# Patient Record
Sex: FEMALE | Race: WHITE | NOT HISPANIC OR LATINO
[De-identification: names, ages, dates, MRNs, and addresses within clinical notes are randomized per-mention and may not be internally consistent; named-entity substitution may affect disease eponyms.]

---

## 2021-04-02 ENCOUNTER — TRANSCRIPTION ENCOUNTER (OUTPATIENT)
Age: 51
End: 2021-04-02

## 2022-02-10 ENCOUNTER — EMERGENCY (EMERGENCY)
Facility: HOSPITAL | Age: 52
LOS: 0 days | Discharge: HOME | End: 2022-02-10
Attending: EMERGENCY MEDICINE
Payer: MEDICARE

## 2022-02-10 VITALS
SYSTOLIC BLOOD PRESSURE: 149 MMHG | HEIGHT: 66 IN | TEMPERATURE: 99 F | RESPIRATION RATE: 18 BRPM | HEART RATE: 106 BPM | WEIGHT: 199.96 LBS | DIASTOLIC BLOOD PRESSURE: 100 MMHG | OXYGEN SATURATION: 95 %

## 2022-02-10 DIAGNOSIS — R07.9 CHEST PAIN, UNSPECIFIED: ICD-10-CM

## 2022-02-10 DIAGNOSIS — I10 ESSENTIAL (PRIMARY) HYPERTENSION: ICD-10-CM

## 2022-02-10 DIAGNOSIS — R00.0 TACHYCARDIA, UNSPECIFIED: ICD-10-CM

## 2022-02-10 DIAGNOSIS — E78.5 HYPERLIPIDEMIA, UNSPECIFIED: ICD-10-CM

## 2022-02-10 LAB
ALBUMIN SERPL ELPH-MCNC: 3.9 G/DL — SIGNIFICANT CHANGE UP (ref 3.3–5)
ALP SERPL-CCNC: 91 U/L — SIGNIFICANT CHANGE UP (ref 40–120)
ALT FLD-CCNC: 57 U/L — SIGNIFICANT CHANGE UP (ref 12–78)
ANION GAP SERPL CALC-SCNC: 8 MMOL/L — SIGNIFICANT CHANGE UP (ref 5–17)
AST SERPL-CCNC: 42 U/L — HIGH (ref 15–37)
BASOPHILS # BLD AUTO: 0.03 K/UL — SIGNIFICANT CHANGE UP (ref 0–0.2)
BASOPHILS NFR BLD AUTO: 0.3 % — SIGNIFICANT CHANGE UP (ref 0–2)
BILIRUB SERPL-MCNC: 0.3 MG/DL — SIGNIFICANT CHANGE UP (ref 0.2–1.2)
BUN SERPL-MCNC: 14 MG/DL — SIGNIFICANT CHANGE UP (ref 7–23)
CALCIUM SERPL-MCNC: 9.3 MG/DL — SIGNIFICANT CHANGE UP (ref 8.5–10.1)
CHLORIDE SERPL-SCNC: 106 MMOL/L — SIGNIFICANT CHANGE UP (ref 96–108)
CO2 SERPL-SCNC: 23 MMOL/L — SIGNIFICANT CHANGE UP (ref 22–31)
CREAT SERPL-MCNC: 0.72 MG/DL — SIGNIFICANT CHANGE UP (ref 0.5–1.3)
D DIMER BLD IA.RAPID-MCNC: 243 NG/ML DDU — HIGH
EOSINOPHIL # BLD AUTO: 0.16 K/UL — SIGNIFICANT CHANGE UP (ref 0–0.5)
EOSINOPHIL NFR BLD AUTO: 1.5 % — SIGNIFICANT CHANGE UP (ref 0–6)
GLUCOSE SERPL-MCNC: 123 MG/DL — HIGH (ref 70–99)
HCG SERPL-ACNC: <1 MIU/ML — SIGNIFICANT CHANGE UP
HCT VFR BLD CALC: 39.9 % — SIGNIFICANT CHANGE UP (ref 34.5–45)
HGB BLD-MCNC: 13.1 G/DL — SIGNIFICANT CHANGE UP (ref 11.5–15.5)
IMM GRANULOCYTES NFR BLD AUTO: 0.3 % — SIGNIFICANT CHANGE UP (ref 0–1.5)
LACTATE SERPL-SCNC: 2 MMOL/L — SIGNIFICANT CHANGE UP (ref 0.7–2)
LYMPHOCYTES # BLD AUTO: 2.82 K/UL — SIGNIFICANT CHANGE UP (ref 1–3.3)
LYMPHOCYTES # BLD AUTO: 26.9 % — SIGNIFICANT CHANGE UP (ref 13–44)
MAGNESIUM SERPL-MCNC: 2 MG/DL — SIGNIFICANT CHANGE UP (ref 1.6–2.6)
MCHC RBC-ENTMCNC: 29.2 PG — SIGNIFICANT CHANGE UP (ref 27–34)
MCHC RBC-ENTMCNC: 32.8 GM/DL — SIGNIFICANT CHANGE UP (ref 32–36)
MCV RBC AUTO: 88.9 FL — SIGNIFICANT CHANGE UP (ref 80–100)
MONOCYTES # BLD AUTO: 0.64 K/UL — SIGNIFICANT CHANGE UP (ref 0–0.9)
MONOCYTES NFR BLD AUTO: 6.1 % — SIGNIFICANT CHANGE UP (ref 2–14)
NEUTROPHILS # BLD AUTO: 6.79 K/UL — SIGNIFICANT CHANGE UP (ref 1.8–7.4)
NEUTROPHILS NFR BLD AUTO: 64.9 % — SIGNIFICANT CHANGE UP (ref 43–77)
PHOSPHATE SERPL-MCNC: 3.4 MG/DL — SIGNIFICANT CHANGE UP (ref 2.5–4.5)
PLATELET # BLD AUTO: 324 K/UL — SIGNIFICANT CHANGE UP (ref 150–400)
POTASSIUM SERPL-MCNC: 4.2 MMOL/L — SIGNIFICANT CHANGE UP (ref 3.5–5.3)
POTASSIUM SERPL-SCNC: 4.2 MMOL/L — SIGNIFICANT CHANGE UP (ref 3.5–5.3)
PROT SERPL-MCNC: 8.7 GM/DL — HIGH (ref 6–8.3)
RBC # BLD: 4.49 M/UL — SIGNIFICANT CHANGE UP (ref 3.8–5.2)
RBC # FLD: 13.2 % — SIGNIFICANT CHANGE UP (ref 10.3–14.5)
SODIUM SERPL-SCNC: 137 MMOL/L — SIGNIFICANT CHANGE UP (ref 135–145)
TROPONIN I, HIGH SENSITIVITY RESULT: 3.58 NG/L — SIGNIFICANT CHANGE UP
TROPONIN I, HIGH SENSITIVITY RESULT: 6.47 NG/L — SIGNIFICANT CHANGE UP
WBC # BLD: 10.47 K/UL — SIGNIFICANT CHANGE UP (ref 3.8–10.5)
WBC # FLD AUTO: 10.47 K/UL — SIGNIFICANT CHANGE UP (ref 3.8–10.5)

## 2022-02-10 PROCEDURE — 71046 X-RAY EXAM CHEST 2 VIEWS: CPT | Mod: 26

## 2022-02-10 PROCEDURE — 85025 COMPLETE CBC W/AUTO DIFF WBC: CPT

## 2022-02-10 PROCEDURE — 99285 EMERGENCY DEPT VISIT HI MDM: CPT | Mod: 25

## 2022-02-10 PROCEDURE — 71046 X-RAY EXAM CHEST 2 VIEWS: CPT

## 2022-02-10 PROCEDURE — 85379 FIBRIN DEGRADATION QUANT: CPT

## 2022-02-10 PROCEDURE — 84100 ASSAY OF PHOSPHORUS: CPT

## 2022-02-10 PROCEDURE — 93005 ELECTROCARDIOGRAM TRACING: CPT

## 2022-02-10 PROCEDURE — 71275 CT ANGIOGRAPHY CHEST: CPT | Mod: 26,ME

## 2022-02-10 PROCEDURE — G1004: CPT

## 2022-02-10 PROCEDURE — 83605 ASSAY OF LACTIC ACID: CPT

## 2022-02-10 PROCEDURE — 99285 EMERGENCY DEPT VISIT HI MDM: CPT

## 2022-02-10 PROCEDURE — 83735 ASSAY OF MAGNESIUM: CPT

## 2022-02-10 PROCEDURE — 84702 CHORIONIC GONADOTROPIN TEST: CPT

## 2022-02-10 PROCEDURE — 80053 COMPREHEN METABOLIC PANEL: CPT

## 2022-02-10 PROCEDURE — 93010 ELECTROCARDIOGRAM REPORT: CPT

## 2022-02-10 PROCEDURE — 71275 CT ANGIOGRAPHY CHEST: CPT | Mod: ME

## 2022-02-10 PROCEDURE — 36415 COLL VENOUS BLD VENIPUNCTURE: CPT

## 2022-02-10 PROCEDURE — 84484 ASSAY OF TROPONIN QUANT: CPT

## 2022-02-10 NOTE — ED ADULT NURSE NOTE - OBJECTIVE STATEMENT
pt presents to ED s/p chest tightness x1 week that radiates to jaw. Received full dose ASA PTA at doctor's office- Dr. Oreilly. hx DM, HTN, HLD.

## 2022-02-10 NOTE — ED STATDOCS - OBJECTIVE STATEMENT
50 y/o female with a PMHx of gestational diabetes, HLD, HTN presents to the ED c/o CP x 1 week and pain radiating to left jaw on 02/05/2022; now resolved. Pt also notes her arm was twitching 2 days ago. Pt went to her PCP, Dr. Oreilly, for symptoms and was sent to ED for evaluation. No sick contacts or recent travel. Pt given 4 ASA by EMS prior to arrival. Pt is not vaccinated for COVID. 50 y/o female with a PMHx of gestational diabetes, HLD, HTN presents to the ED c/o CP x 1 week s/p getting a mammogram. Pt also reports she had pain radiating to left jaw on 02/05/2022 now resolved and arm twitching 2 days ago. Pt went to her PCP, Dr. Oreilly, for symptoms and was sent to ED for evaluation. Denies other symptoms. No sick contacts or recent travel. Pt given 4 ASA by EMS prior to arrival. Pt is not vaccinated for COVID.

## 2022-02-10 NOTE — ED STATDOCS - ATTENDING CONTRIBUTION TO CARE
I, Mallory Flores MD,  performed the initial face to face bedside interview with this patient regarding history of present illness, review of symptoms and relevant past medical, social and family history.  I completed an independent physical examination.  I was the initial provider who evaluated this patient.   I personally saw the patient and performed a substantive portion of the visit including all aspects of the medical decision making.  I have signed out the follow up of any pending tests (i.e. labs, radiological studies) to the ACP.  I have communicated the patient’s plan of care and disposition with the ACP.  The history, relevant review of systems, past medical and surgical history, medical decision making, and physical examination was documented by the scribe in my presence and I attest to the accuracy of the documentation.

## 2022-02-10 NOTE — ED STATDOCS - PATIENT PORTAL LINK FT
You can access the FollowMyHealth Patient Portal offered by [facility] by registering at the following website: [email or web address]. By joining [facility]’s FollowMyHealth portal, you will also be able to view your health information using other applications (apps) compatible with our system.

## 2022-02-10 NOTE — ED STATDOCS - CARE PROVIDER_API CALL
Von Gerardo (MD)  Cardiovascular Disease  [address], [address]  [place] [address]  Phone: [phone]  Fax: [phone]  Follow Up Time: Urgent

## 2022-02-10 NOTE — ED STATDOCS - PROGRESS NOTE DETAILS
pt aware of labs and agrees to CTA r/o PE and well appearing. -Betty Venegas PA-C pt aware of all labs and imaging results and will fu with cardiologist and her PMD. pt well appearing on dc and agrees with plan. -Betty Venegas PA-C

## 2022-02-28 ENCOUNTER — NON-APPOINTMENT (OUTPATIENT)
Age: 52
End: 2022-02-28

## 2022-02-28 ENCOUNTER — APPOINTMENT (OUTPATIENT)
Dept: CARDIOLOGY | Facility: CLINIC | Age: 52
End: 2022-02-28
Payer: MEDICARE

## 2022-02-28 VITALS
DIASTOLIC BLOOD PRESSURE: 76 MMHG | SYSTOLIC BLOOD PRESSURE: 124 MMHG | RESPIRATION RATE: 16 BRPM | HEIGHT: 66 IN | BODY MASS INDEX: 31.5 KG/M2 | TEMPERATURE: 98.2 F | OXYGEN SATURATION: 100 % | HEART RATE: 101 BPM | WEIGHT: 196 LBS

## 2022-02-28 DIAGNOSIS — R00.0 TACHYCARDIA, UNSPECIFIED: ICD-10-CM

## 2022-02-28 DIAGNOSIS — E66.9 OBESITY, UNSPECIFIED: ICD-10-CM

## 2022-02-28 DIAGNOSIS — Z78.9 OTHER SPECIFIED HEALTH STATUS: ICD-10-CM

## 2022-02-28 PROCEDURE — 99205 OFFICE O/P NEW HI 60 MIN: CPT

## 2022-02-28 PROCEDURE — 93000 ELECTROCARDIOGRAM COMPLETE: CPT

## 2022-02-28 NOTE — PHYSICAL EXAM
[Obese] : obese [Normal Conjunctiva] : normal conjunctiva [Normal Venous Pressure] : normal venous pressure [No Carotid Bruit] : no carotid bruit [Normal S1, S2] : normal S1, S2 [No Murmur] : no murmur [No Rub] : no rub [No Gallop] : no gallop [Clear Lung Fields] : clear lung fields [Good Air Entry] : good air entry [No Respiratory Distress] : no respiratory distress  [Soft] : abdomen soft [Non Tender] : non-tender [No Masses/organomegaly] : no masses/organomegaly [Normal Bowel Sounds] : normal bowel sounds [Normal Gait] : normal gait [No Edema] : no edema [No Cyanosis] : no cyanosis [No Clubbing] : no clubbing [No Varicosities] : no varicosities [No Rash] : no rash [No Skin Lesions] : no skin lesions [Moves all extremities] : moves all extremities [No Focal Deficits] : no focal deficits [Normal Speech] : normal speech [Alert and Oriented] : alert and oriented [Normal memory] : normal memory

## 2022-02-28 NOTE — HISTORY OF PRESENT ILLNESS
[FreeTextEntry1] : 51 year old female with hx HTN DM  HLD obesity came for cardiac evaluation with complain she was referred from [facility] for cardiac evaluation after evaluation for chest pain.\par \par as per patient who had mammogram two days prior to onset of chest , was feeling feeling discomfort in left breast  was arguing with some one , developed worsening of chest discomfort worsening , felt some left jaw discomfort , went to primary office next day , where she was noted to be tachycardic , patient denies any shortness of breath or dizziness. Patient says her chest discomfort was there for one week , patient says she felt better with stretching , patient  had blood work showed normal troponin , ekg was normal , \par \par Patient had normal CT Angiogram  showed no evidence of PE , patient says she was not taking medication for Hypertension that was prescribed 2 years ago, also was given started  statin which she never took it , \par \par  Patient is not taking medication     her blood pressure is normal without any medication, \par \par patients hospital visit records reviewed ,  ekg sinus tachycardia 104 BPM  had minimal elevated d dimer , for which she had CT angio  negative for PE , patient  had normal troponin .

## 2022-03-10 ENCOUNTER — APPOINTMENT (OUTPATIENT)
Dept: CARDIOLOGY | Facility: CLINIC | Age: 52
End: 2022-03-10
Payer: MEDICARE

## 2022-03-11 ENCOUNTER — APPOINTMENT (OUTPATIENT)
Dept: CARDIOLOGY | Facility: CLINIC | Age: 52
End: 2022-03-11
Payer: MEDICARE

## 2022-03-11 ENCOUNTER — NON-APPOINTMENT (OUTPATIENT)
Age: 52
End: 2022-03-11

## 2022-03-11 PROCEDURE — 93306 TTE W/DOPPLER COMPLETE: CPT

## 2022-03-11 PROCEDURE — 93015 CV STRESS TEST SUPVJ I&R: CPT

## 2022-03-17 ENCOUNTER — NON-APPOINTMENT (OUTPATIENT)
Age: 52
End: 2022-03-17

## 2022-03-21 ENCOUNTER — NON-APPOINTMENT (OUTPATIENT)
Age: 52
End: 2022-03-21

## 2022-03-21 PROCEDURE — 93224 XTRNL ECG REC UP TO 48 HRS: CPT

## 2022-03-22 LAB
ESTIMATED AVERAGE GLUCOSE: 157 MG/DL
HBA1C MFR BLD HPLC: 7.1 %
T3FREE SERPL-MCNC: 3.08 PG/ML
T4 FREE SERPL-MCNC: 1.1 NG/DL
TSH SERPL-ACNC: 1.26 UIU/ML

## 2022-03-28 ENCOUNTER — APPOINTMENT (OUTPATIENT)
Dept: CARDIOLOGY | Facility: CLINIC | Age: 52
End: 2022-03-28
Payer: MEDICARE

## 2022-03-28 VITALS
BODY MASS INDEX: 32.14 KG/M2 | SYSTOLIC BLOOD PRESSURE: 112 MMHG | HEART RATE: 86 BPM | DIASTOLIC BLOOD PRESSURE: 84 MMHG | WEIGHT: 200 LBS | HEIGHT: 66 IN | OXYGEN SATURATION: 100 %

## 2022-03-28 DIAGNOSIS — I10 ESSENTIAL (PRIMARY) HYPERTENSION: ICD-10-CM

## 2022-03-28 DIAGNOSIS — E78.5 HYPERLIPIDEMIA, UNSPECIFIED: ICD-10-CM

## 2022-03-28 DIAGNOSIS — E11.9 TYPE 2 DIABETES MELLITUS W/OUT COMPLICATIONS: ICD-10-CM

## 2022-03-28 DIAGNOSIS — R07.89 OTHER CHEST PAIN: ICD-10-CM

## 2022-03-28 PROCEDURE — 99214 OFFICE O/P EST MOD 30 MIN: CPT

## 2022-03-28 RX ORDER — METFORMIN ER 500 MG 500 MG/1
500 TABLET ORAL
Qty: 360 | Refills: 0 | Status: ACTIVE | COMMUNITY
Start: 1900-01-01 | End: 1900-01-01

## 2022-03-28 NOTE — ASSESSMENT
[FreeTextEntry1] : Patient with above hx \par \par atypical chest pain : possible breast pain  ( mammogram injury ? )  , possible due to GERD, doubt cardiac origin , negative troponin ,   no recurrence , normal exercise stress test , \par \par Abnormal ekg : sinus tachycardia , patient does have high normal range resting heart as per patient , patient  lowest 74 BPM during sleep , Average 100 BPM , highest 178  during exercise , \par \par HTN  controlled continue diet restriction . Patient was not taking prescribed medication  \par \par DM : encourage patient to follow diet restriction ,HB A1c 7.1  increase metformin to 1000 mg po BID  follow up with primary \par \par Hyperlipidemia Minimal elevated total cholesterol encourage patient to loose weight  , diet restriction , follow up repeat blood work \par \par \par follow up after 4 months

## 2022-03-28 NOTE — CARDIOLOGY SUMMARY
[de-identified] : 3/21/22  sinus rhythm , rare isolated PACS   2 ,    2 isolated PVC  [de-identified] : 2/28/22  sinus tachycardia 101  [de-identified] : 3/28/22  12.42  101% 10 METS  No ST changes  [de-identified] : 3/11/22  EF 63% Mild DD Trace MR

## 2022-03-28 NOTE — HISTORY OF PRESENT ILLNESS
[FreeTextEntry1] : 51 year old female with hx HTN DM  HLD obesity came for follow up after having recommended tests , Patient had normal  stress test ,  echo showed normal EF mild DD , monitor showed 2 isolated PAcS , PVC  \par \par no recurrence of chest pain  since last visit \par \par patient denies any shortness of breath or dizziness. Patient says her chest discomfort was there for one week , patient says she felt better with stretching , patient  had blood work showed normal troponin , ekg was normal , \par \par Patient had normal CT Angiogram  showed no evidence of PE , patient says she was not taking medication for Hypertension that was prescribed 2 years ago, also was given started  statin which she never took it , \par \par  Patient is not taking medication     her blood pressure is normal without any medication, \par \par patients hospital visit records reviewed ,  ekg sinus tachycardia 104 BPM  had minimal elevated d dimer , for which she had CT angio  negative for PE , patient  had normal troponin .\par \par Patient  blood work showed HB A1c 7.1  ,  normal  TSH   holter average rate 100

## 2022-03-30 ENCOUNTER — APPOINTMENT (OUTPATIENT)
Dept: CARDIOLOGY | Facility: CLINIC | Age: 52
End: 2022-03-30

## 2022-04-19 ENCOUNTER — APPOINTMENT (OUTPATIENT)
Dept: AFTER HOURS CARE | Facility: EMERGENCY ROOM | Age: 52
End: 2022-04-19

## 2022-04-20 PROCEDURE — 99204 OFFICE O/P NEW MOD 45 MIN: CPT | Mod: 1L,95

## 2022-04-25 PROBLEM — Z00.00 ENCOUNTER FOR PREVENTIVE HEALTH EXAMINATION: Noted: 2022-04-25

## 2022-05-09 ENCOUNTER — APPOINTMENT (OUTPATIENT)
Dept: ORTHOPEDIC SURGERY | Facility: CLINIC | Age: 52
End: 2022-05-09
Payer: MEDICARE

## 2022-05-09 VITALS — BODY MASS INDEX: 29.73 KG/M2 | WEIGHT: 185 LBS | HEIGHT: 66 IN

## 2022-05-09 DIAGNOSIS — M54.16 RADICULOPATHY, LUMBAR REGION: ICD-10-CM

## 2022-05-09 DIAGNOSIS — M51.26 OTHER INTERVERTEBRAL DISC DISPLACEMENT, LUMBAR REGION: ICD-10-CM

## 2022-05-09 PROCEDURE — 99213 OFFICE O/P EST LOW 20 MIN: CPT
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
## 2022-08-08 ENCOUNTER — APPOINTMENT (OUTPATIENT)
Dept: ORTHOPEDIC SURGERY | Facility: CLINIC | Age: 52
End: 2022-08-08

## 2022-11-05 NOTE — ASSESSMENT
[FreeTextEntry1] : urinary symptoms without fever, chills, nausea, vomiting, persistent abd pain, flank pain

## 2022-11-05 NOTE — HISTORY OF PRESENT ILLNESS
[Home] : at home, [unfilled] , at the time of the visit. [Other Location: e.g. Home (Enter Location, City,State)___] : at [unfilled] [Verbal consent obtained from patient] : the patient, [unfilled] [FreeTextEntry8] : 50 yo female with PMH DM for evaluation of urinary symptoms since yesterday. Urgerncy with small urine quantities, increased frequency, hematuria. Denies dysuria, abd pain, fevers chills, back pain, nausea, vomiting. \par allergies: none\par LMP: last month end of month - states she doesn't think she is pregnant

## 2022-11-09 ENCOUNTER — RESULT REVIEW (OUTPATIENT)
Age: 52
End: 2022-11-09

## 2023-04-03 PROBLEM — O24.419 GESTATIONAL DIABETES MELLITUS IN PREGNANCY, UNSPECIFIED CONTROL: Chronic | Status: ACTIVE | Noted: 2022-02-10

## 2023-04-03 PROBLEM — I10 ESSENTIAL (PRIMARY) HYPERTENSION: Chronic | Status: ACTIVE | Noted: 2022-02-10

## 2023-04-03 PROBLEM — E78.5 HYPERLIPIDEMIA, UNSPECIFIED: Chronic | Status: ACTIVE | Noted: 2022-02-10

## 2023-04-13 ENCOUNTER — APPOINTMENT (OUTPATIENT)
Dept: OTOLARYNGOLOGY | Facility: CLINIC | Age: 53
End: 2023-04-13
Payer: MEDICARE

## 2023-04-13 VITALS — SYSTOLIC BLOOD PRESSURE: 141 MMHG | DIASTOLIC BLOOD PRESSURE: 91 MMHG | HEART RATE: 75 BPM

## 2023-04-13 DIAGNOSIS — R73.03 PREDIABETES.: ICD-10-CM

## 2023-04-13 PROCEDURE — 31231 NASAL ENDOSCOPY DX: CPT

## 2023-04-13 PROCEDURE — 99204 OFFICE O/P NEW MOD 45 MIN: CPT | Mod: 25

## 2023-04-13 RX ORDER — NAPROXEN 500 MG/1
500 TABLET ORAL
Qty: 60 | Refills: 2 | Status: COMPLETED | COMMUNITY
Start: 2022-05-09 | End: 2023-04-13

## 2023-04-13 RX ORDER — FLUTICASONE PROPIONATE 50 MCG
SPRAY, SUSPENSION NASAL
Refills: 0 | Status: ACTIVE | COMMUNITY

## 2023-04-13 RX ORDER — LISINOPRIL 10 MG/1
10 TABLET ORAL
Refills: 0 | Status: COMPLETED | COMMUNITY
End: 2023-04-13

## 2023-04-13 NOTE — HISTORY OF PRESENT ILLNESS
[de-identified] : 52 year old female presents for evaluation of L-sided sinus symptoms\par Started 3wk ago with L-sided dental pain\par Very clogged left nostril, left rhinorrhea and sinus pressure went to urgent care and was prescribed Flonase and Amoxcillin (never took the antibiotic)\par Reports Thick mucus in throat that occasionally feels stuck. \par She developed a sore in mouth but has now subsided. \par Used saline rinses and had green/yellow foul smelling odor- while using rinses reports bilateral otalgia \par States voice has been raspy\par Sense of smell and taste is poor \par Recurrent sinus infections: No\par \par Type 2 DM (metformin)

## 2023-04-17 ENCOUNTER — APPOINTMENT (OUTPATIENT)
Dept: CT IMAGING | Facility: CLINIC | Age: 53
End: 2023-04-17

## 2023-04-21 LAB — EAR NOSE AND THROAT CULTURE: ABNORMAL

## 2023-04-21 RX ORDER — SULFAMETHOXAZOLE AND TRIMETHOPRIM 800; 160 MG/1; MG/1
800-160 TABLET ORAL TWICE DAILY
Qty: 20 | Refills: 0 | Status: ACTIVE | COMMUNITY
Start: 2023-04-21 | End: 1900-01-01

## 2023-05-23 ENCOUNTER — APPOINTMENT (OUTPATIENT)
Dept: OTOLARYNGOLOGY | Facility: CLINIC | Age: 53
End: 2023-05-23
Payer: MEDICARE

## 2023-05-23 VITALS
OXYGEN SATURATION: 99 % | BODY MASS INDEX: 30.53 KG/M2 | SYSTOLIC BLOOD PRESSURE: 120 MMHG | HEIGHT: 66 IN | WEIGHT: 190 LBS | HEART RATE: 90 BPM | DIASTOLIC BLOOD PRESSURE: 85 MMHG

## 2023-05-23 PROCEDURE — 99214 OFFICE O/P EST MOD 30 MIN: CPT | Mod: 25

## 2023-05-23 PROCEDURE — 31231 NASAL ENDOSCOPY DX: CPT

## 2023-05-23 RX ORDER — AMOXICILLIN AND CLAVULANATE POTASSIUM 875; 125 MG/1; MG/1
875-125 TABLET, COATED ORAL
Qty: 20 | Refills: 0 | Status: DISCONTINUED | COMMUNITY
Start: 2023-04-13 | End: 2023-05-23

## 2023-05-23 NOTE — HISTORY OF PRESENT ILLNESS
[de-identified] : 52 year old woman with hx of chronic sinusitis presents for follow-up\par LCV 4/13/23\par Took PO abx, INCS, steroid rinses-- stopped abx due to allergic reaction\par States symptoms not completely resolved - intermittent nasal congestion - whistling sound from nose heard when sleeping at night - increased Left facial pressure - persistent poor sense of smell - ears and throat are now affected by sinus symptoms - otalgia with clogged sensation and voice changes with throat clearing\par No recent sinus infections \par \par CT Maxifacial Sinus 4/14/23-- reviewed personally-- IMPRESSION: Finding consistent with obstruction of multiple drainage pathways in the Left hiatus semilunaris.  Soft tissue in the Left maxillary sinus is in continuity with the soft tissues in the Left middle meatus and could be inflammatory though an underlying neoplastic etiology must also be considered.  Finally, an odontogenic origin of an infectious sinusitis must also be considered.

## 2023-05-23 NOTE — PHYSICAL EXAM
[Nasal Endoscopy Performed] : nasal endoscopy was performed, see procedure section for findings [Midline] : trachea located in midline position [de-identified] : No OA fistula [Normal] : no rashes

## 2023-06-16 ENCOUNTER — APPOINTMENT (OUTPATIENT)
Dept: OTOLARYNGOLOGY | Facility: CLINIC | Age: 53
End: 2023-06-16
Payer: MEDICARE

## 2023-06-16 VITALS
SYSTOLIC BLOOD PRESSURE: 138 MMHG | HEIGHT: 66 IN | HEART RATE: 87 BPM | DIASTOLIC BLOOD PRESSURE: 90 MMHG | WEIGHT: 190 LBS | BODY MASS INDEX: 30.53 KG/M2

## 2023-06-16 DIAGNOSIS — K21.9 GASTRO-ESOPHAGEAL REFLUX DISEASE W/OUT ESOPHAGITIS: ICD-10-CM

## 2023-06-16 DIAGNOSIS — R49.0 DYSPHONIA: ICD-10-CM

## 2023-06-16 PROCEDURE — 92567 TYMPANOMETRY: CPT

## 2023-06-16 PROCEDURE — 99213 OFFICE O/P EST LOW 20 MIN: CPT | Mod: 25

## 2023-06-16 PROCEDURE — 92557 COMPREHENSIVE HEARING TEST: CPT

## 2023-06-16 PROCEDURE — 31231 NASAL ENDOSCOPY DX: CPT

## 2023-06-16 NOTE — HISTORY OF PRESENT ILLNESS
[de-identified] : Yaneli Nick is a 51 yo female with hx T2DM who presents for evaluation of chronic sinusitis. She had left nasal congestion and sinus pressure since Mar 2023. She saw Dr. Rodrigues who cultured purulent drainage showing Klebsiella. She notes that she had a dental extraction years ago on the left side which may be leading to her symptoms. She has been on sinus rinses, previous round of antibiotics, and flonase. She notes nasal congestion, sinus pressure, postnasal drainage. She notes hoarseness of her voice. She denies vision changes or pain/restriction of extraocular movements. She denies fevers. She denies history of recurrent sinusitis prior to this. She notes remote allergy testing that was positive for oak, cat dander, dog dander. She did have CT sinus with Dr. Rodrgiues.\par \par She denies dyspnea, odynophagia. She notes some mild dysphagia to certain solids. She denies aspiration episodes. She denies significant heartburn.\par \par She notes left sided hearing loss for the past 1.5 months. She denies otalgia, otorrhea, tinnitus, vertigo, recurrent ear infections.

## 2023-06-16 NOTE — ASSESSMENT
[FreeTextEntry1] : Yaneli Nick presents for evaluation. She has left chronic sinusitis likely odontogenic in origin, and is scheduled for FESS with Dr. Rodrigues in August 2023. CT sinus was reviewed today. Sinonasal endoscopy was performed showing septal deviation to left and left sided purulence. Previous culture grew klebsiella but she did not take prescribed bactrim due to reaction to augmentin. She also notes dysphonia and mild intermittent dysphagia. Flexible laryngoscopy was performed revealing postcricoid inflammation consistent with laryngopharyngeal reflux. Finally, she notes left hearing loss for the past 1.5 months. Audiogram was performed and reviewed showing type A tymps AU, normal hearing AD, and mild mixed hearing loss rising to normal AS. Discussed with her that her hearing loss is likely secondary to eustachian tube dysfunction from her sinonasal inflammation. The sensorineural component may be chronic, but regardless, it has been 1.5 months since symptom onset and treatment with oral steroids/intratympanic steroids would not be effective. She understands this. If sensorineural loss persists even after surgery, can consider MRI in future.\par \par - Continue sinus rinses and flonase BID.\par - Encouraged her to take bactrim prescribed by Dr. Rodrigues.\par - Start antireflux lifestyle and dietary modifications - handout provided.\par - Follow up with Dr. Rodrigues.

## 2023-06-16 NOTE — REVIEW OF SYSTEMS
[Post Nasal Drip] : post nasal drip [Hearing Loss] : hearing loss [Nasal Congestion] : nasal congestion [Sinus Pressure] : sinus pressure [Hoarseness] : hoarseness [Negative] : Heme/Lymph

## 2023-06-16 NOTE — DATA REVIEWED
[de-identified] : Tymps: Type A, au\par Right: Hearing - 8khz\par Left: Mild rising to wnl mixed hl 250-8khz

## 2023-08-10 ENCOUNTER — OUTPATIENT (OUTPATIENT)
Dept: OUTPATIENT SERVICES | Facility: HOSPITAL | Age: 53
LOS: 1 days | End: 2023-08-10
Payer: MEDICARE

## 2023-08-10 VITALS
TEMPERATURE: 98 F | RESPIRATION RATE: 18 BRPM | OXYGEN SATURATION: 99 % | SYSTOLIC BLOOD PRESSURE: 135 MMHG | DIASTOLIC BLOOD PRESSURE: 96 MMHG | HEIGHT: 65 IN | HEART RATE: 84 BPM | WEIGHT: 197.98 LBS

## 2023-08-10 DIAGNOSIS — J32.9 CHRONIC SINUSITIS, UNSPECIFIED: ICD-10-CM

## 2023-08-10 DIAGNOSIS — E11.9 TYPE 2 DIABETES MELLITUS WITHOUT COMPLICATIONS: ICD-10-CM

## 2023-08-10 DIAGNOSIS — Z01.818 ENCOUNTER FOR OTHER PREPROCEDURAL EXAMINATION: ICD-10-CM

## 2023-08-10 DIAGNOSIS — Z98.890 OTHER SPECIFIED POSTPROCEDURAL STATES: Chronic | ICD-10-CM

## 2023-08-10 PROCEDURE — G0463: CPT

## 2023-08-10 NOTE — H&P PST ADULT - NSANTHOSAYNRD_GEN_A_CORE
No. MADDIE screening performed.  STOP BANG Legend: 0-2 = LOW Risk; 3-4 = INTERMEDIATE Risk; 5-8 = HIGH Risk Tranexamic Acid Counseling:  Patient advised of the small risk of bleeding problems with tranexamic acid. They were also instructed to call if they developed any nausea, vomiting or diarrhea. All of the patient's questions and concerns were addressed.

## 2023-08-10 NOTE — H&P PST ADULT - NSICDXFAMILYHX_GEN_ALL_CORE_FT
FAMILY HISTORY:  Father  Still living? Unknown  Family history of diabetes mellitus (DM), Age at diagnosis: Age Unknown    Sibling  Still living? Unknown  Family history of melanoma, Age at diagnosis: Age Unknown  FH: Hodgkins disease, Age at diagnosis: Age Unknown

## 2023-08-10 NOTE — H&P PST ADULT - PHARYNX
normal/no redness/no discharge/no peritonsillar abscess Mallampati III/normal/no redness/no discharge/no peritonsillar abscess

## 2023-08-10 NOTE — H&P PST ADULT - HISTORY OF PRESENT ILLNESS
Pt is a 51 y/o F with PMHx of DM type 2 presents for perioperative testing for left endoscopic sinus surgery with Dr. Kalia Rodrigues scheduled for 08/18/2023. reports pressure to left face, eye, ear, hearing gradual loss of voice change for several months, did diagnsoitc tetsing, had kelbsialla, finsihed course of abx with some relief. opted for surgical intervention. denies tinnitus, ear/nose dishcrge, fever, chills, sob, cp. reports feeling well overall  Pt is a 51 y/o F with PMHx of DM type 2 presents for perioperative testing for left endoscopic sinus surgery with Dr. Kalia Rodrigues scheduled for 08/18/2023. Reports having pressure to left face, eye, ear with gradual loss/change and voice change for several months, had diagnostic testing, came back that she had Klebsiella finished course of Augmentin with some relief and opted for surgical intervention. Denies tinnitus, discharge of ear/nose, fever, chills, SOB, CP, vertigo, or any other acute symptoms. Reports feeling well overall  Pt is a 51 y/o F with PMHx of HTN (not on meds), HLD, DM type 2 presents for perioperative testing for left endoscopic sinus surgery with Dr. Kalia Rodrigues scheduled for 08/18/2023. Reports having pressure to left face, eye, ear with gradual loss/change and voice change for several months, had diagnostic testing, came back that she had Klebsiella finished course of Augmentin with some relief and opted for surgical intervention. Denies tinnitus, discharge of ear/nose, fever, chills, SOB, CP, vertigo, or any other acute symptoms. Reports feeling well overall

## 2023-08-10 NOTE — H&P PST ADULT - PROBLEM SELECTOR PLAN 1
Patient provided with pre-operative instructions and verbalized understanding.  Patient will be NPO on day of surgery. Patient will stop NSAIDs, aspirin, herbal supplements or vitamins 1 week prior to surgery.  Chlorhexidine wash provided with instructions.     Pending medical clearance as per surgeon.    CBC, BMP, HA1c and EKG obtained from PCP [facility] visit Patient provided with pre-operative instructions and verbalized understanding.  Patient will be NPO on day of surgery. Patient will stop NSAIDs, aspirin, herbal supplements or vitamins 1 week prior to surgery.  Chlorhexidine wash provided with instructions.     Pending medical clearance as per surgeon.    CBC, BMP, HA1c and EKG obtained from PCP [facility] visit    hold metformin 24 prior to surgery

## 2023-08-10 NOTE — H&P PST ADULT - NEGATIVE ENMT SYMPTOMS
no tinnitus/no vertigo/no nose bleeds/no recurrent cold sores/no abnormal taste sensation/no gum bleeding/no dry mouth/no throat pain/no dysphagia

## 2023-08-10 NOTE — H&P PST ADULT - LAST CARDIAC ANGIOGRAM/IMAGING
Received prior authorization request for Diclofenac gel.   Office visit is note is not complete.  Please specific what the diagnosis is for diclofenac gel?     MEDICATION:     DX: Unknown    Received notice from [facility] that Diclofenac Gel requires prior authorization.  Ordering provider: Candice Vasquez NP         denies

## 2023-08-10 NOTE — H&P PST ADULT - NSICDXPASTMEDICALHX_GEN_ALL_CORE_FT
PAST MEDICAL HISTORY:  DM2 (diabetes mellitus, type 2)     Gestational diabetes     HLD (hyperlipidemia)     HTN (hypertension)

## 2023-08-17 ENCOUNTER — TRANSCRIPTION ENCOUNTER (OUTPATIENT)
Age: 53
End: 2023-08-17

## 2023-08-17 RX ORDER — OXYCODONE 5 MG/1
5 TABLET ORAL
Qty: 12 | Refills: 0 | Status: ACTIVE | COMMUNITY
Start: 2023-08-17 | End: 1900-01-01

## 2023-08-17 RX ORDER — DOXYCYCLINE 100 MG/1
100 CAPSULE ORAL
Qty: 14 | Refills: 0 | Status: ACTIVE | COMMUNITY
Start: 2023-08-17 | End: 1900-01-01

## 2023-08-18 ENCOUNTER — OUTPATIENT (OUTPATIENT)
Dept: OUTPATIENT SERVICES | Facility: HOSPITAL | Age: 53
LOS: 1 days | End: 2023-08-18
Payer: MEDICARE

## 2023-08-18 ENCOUNTER — RESULT REVIEW (OUTPATIENT)
Age: 53
End: 2023-08-18

## 2023-08-18 ENCOUNTER — APPOINTMENT (OUTPATIENT)
Dept: OTOLARYNGOLOGY | Facility: HOSPITAL | Age: 53
End: 2023-08-18

## 2023-08-18 ENCOUNTER — TRANSCRIPTION ENCOUNTER (OUTPATIENT)
Age: 53
End: 2023-08-18

## 2023-08-18 VITALS
HEIGHT: 65 IN | DIASTOLIC BLOOD PRESSURE: 88 MMHG | HEART RATE: 102 BPM | RESPIRATION RATE: 14 BRPM | SYSTOLIC BLOOD PRESSURE: 125 MMHG | OXYGEN SATURATION: 99 % | WEIGHT: 197.98 LBS | TEMPERATURE: 98 F

## 2023-08-18 VITALS
HEART RATE: 95 BPM | RESPIRATION RATE: 16 BRPM | DIASTOLIC BLOOD PRESSURE: 75 MMHG | TEMPERATURE: 97 F | SYSTOLIC BLOOD PRESSURE: 129 MMHG | OXYGEN SATURATION: 98 %

## 2023-08-18 DIAGNOSIS — J32.9 CHRONIC SINUSITIS, UNSPECIFIED: ICD-10-CM

## 2023-08-18 DIAGNOSIS — E11.9 TYPE 2 DIABETES MELLITUS WITHOUT COMPLICATIONS: ICD-10-CM

## 2023-08-18 DIAGNOSIS — Z98.890 OTHER SPECIFIED POSTPROCEDURAL STATES: Chronic | ICD-10-CM

## 2023-08-18 LAB — GLUCOSE BLDC GLUCOMTR-MCNC: 157 MG/DL — HIGH (ref 70–99)

## 2023-08-18 PROCEDURE — 88305 TISSUE EXAM BY PATHOLOGIST: CPT | Mod: 26

## 2023-08-18 PROCEDURE — 31276 NSL/SINS NDSC FRNT TISS RMVL: CPT

## 2023-08-18 PROCEDURE — 61782 SCAN PROC CRANIAL EXTRA: CPT

## 2023-08-18 PROCEDURE — 82962 GLUCOSE BLOOD TEST: CPT

## 2023-08-18 PROCEDURE — 31253 NSL/SINS NDSC TOTAL: CPT | Mod: LT

## 2023-08-18 PROCEDURE — C2625: CPT

## 2023-08-18 PROCEDURE — 88311 DECALCIFY TISSUE: CPT | Mod: 26

## 2023-08-18 PROCEDURE — 31267 ENDOSCOPY MAXILLARY SINUS: CPT | Mod: LT

## 2023-08-18 PROCEDURE — 88311 DECALCIFY TISSUE: CPT

## 2023-08-18 PROCEDURE — C9399: CPT

## 2023-08-18 PROCEDURE — 88300 SURGICAL PATH GROSS: CPT | Mod: 26

## 2023-08-18 PROCEDURE — 30520 REPAIR OF NASAL SEPTUM: CPT

## 2023-08-18 PROCEDURE — C1889: CPT

## 2023-08-18 PROCEDURE — 31259 NSL/SINS NDSC SPHN TISS RMVL: CPT

## 2023-08-18 PROCEDURE — 31288 NASAL/SINUS ENDOSCOPY SURG: CPT | Mod: LT

## 2023-08-18 PROCEDURE — 88300 SURGICAL PATH GROSS: CPT

## 2023-08-18 PROCEDURE — 88305 TISSUE EXAM BY PATHOLOGIST: CPT

## 2023-08-18 DEVICE — SURGIFLO HEMOSTATIC MATRIX KIT
Type: IMPLANTABLE DEVICE | Status: NON-FUNCTIONAL
Removed: 2023-08-18

## 2023-08-18 DEVICE — STENT SINUS DRUG ELUDING PROPEL CONTOUR
Type: IMPLANTABLE DEVICE | Status: NON-FUNCTIONAL
Removed: 2023-08-18

## 2023-08-18 RX ORDER — HYDROMORPHONE HYDROCHLORIDE 2 MG/ML
1 INJECTION INTRAMUSCULAR; INTRAVENOUS; SUBCUTANEOUS
Refills: 0 | Status: DISCONTINUED | OUTPATIENT
Start: 2023-08-18 | End: 2023-08-18

## 2023-08-18 RX ORDER — SODIUM CHLORIDE 9 MG/ML
1000 INJECTION, SOLUTION INTRAVENOUS
Refills: 0 | Status: DISCONTINUED | OUTPATIENT
Start: 2023-08-18 | End: 2023-08-18

## 2023-08-18 RX ORDER — HYDROMORPHONE HYDROCHLORIDE 2 MG/ML
0.5 INJECTION INTRAMUSCULAR; INTRAVENOUS; SUBCUTANEOUS
Refills: 0 | Status: DISCONTINUED | OUTPATIENT
Start: 2023-08-18 | End: 2023-08-18

## 2023-08-18 RX ORDER — ONDANSETRON 8 MG/1
4 TABLET, FILM COATED ORAL ONCE
Refills: 0 | Status: DISCONTINUED | OUTPATIENT
Start: 2023-08-18 | End: 2023-08-18

## 2023-08-18 RX ORDER — ERGOCALCIFEROL 1.25 MG/1
0 CAPSULE ORAL
Refills: 0 | DISCHARGE

## 2023-08-18 RX ORDER — ONDANSETRON 8 MG/1
4 TABLET, FILM COATED ORAL ONCE
Refills: 0 | Status: COMPLETED | OUTPATIENT
Start: 2023-08-18 | End: 2023-08-18

## 2023-08-18 RX ORDER — ASCORBIC ACID 60 MG
0 TABLET,CHEWABLE ORAL
Refills: 0 | DISCHARGE

## 2023-08-18 RX ORDER — ONDANSETRON 8 MG/1
1 TABLET, FILM COATED ORAL
Refills: 0 | DISCHARGE

## 2023-08-18 RX ORDER — METFORMIN HYDROCHLORIDE 850 MG/1
1 TABLET ORAL
Refills: 0 | DISCHARGE

## 2023-08-18 RX ADMIN — HYDROMORPHONE HYDROCHLORIDE 0.5 MILLIGRAM(S): 2 INJECTION INTRAMUSCULAR; INTRAVENOUS; SUBCUTANEOUS at 17:07

## 2023-08-18 RX ADMIN — SODIUM CHLORIDE 50 MILLILITER(S): 9 INJECTION, SOLUTION INTRAVENOUS at 11:33

## 2023-08-18 RX ADMIN — ONDANSETRON 4 MILLIGRAM(S): 8 TABLET, FILM COATED ORAL at 18:07

## 2023-08-18 NOTE — BRIEF OPERATIVE NOTE - NSICDXBRIEFPROCEDURE_GEN_ALL_CORE_FT
PROCEDURES:  Endoscopic sinus surgery 18-Aug-2023 15:49:35  Kalia Rodrigues  Nasal septoplasty 18-Aug-2023 15:49:44  Kalia Rodrigues

## 2023-08-18 NOTE — ASU DISCHARGE PLAN (ADULT/PEDIATRIC) - DISCHARGE PLAN IS COMPLETE AND GIVEN TO PATIENT
Yes

## 2023-08-18 NOTE — ASU DISCHARGE PLAN (ADULT/PEDIATRIC) - FOR NEXT 24 HOURS DO NOT:
After Visit Summary   11/26/2018    David Medina    MRN: [id number]           Patient Information     Date Of Birth          1937        Visit Information        Provider Department      11/26/2018 10:00 AM Beatriz Betancur MD [facility]        Today's Diagnoses     Type 2 diabetes mellitus with diabetic polyneuropathy, without long-term current use of insulin (H)    -  1    Screening for diabetic peripheral neuropathy        ASCVD (arteriosclerotic cardiovascular disease)        Ischemic cardiomyopathy        Hyperlipidemia with target LDL less than 70        Hypertension goal BP (blood pressure) < 140/90          Care Instructions    You can discontinue the metformin as your Hemoglobin A1C level (a test assessing overall blood sugar control for the last 3 months) is excellent today.      Results for orders placed or performed in visit on 11/26/18   Hemoglobin A1c   Result Value Ref Range    Hemoglobin A1C 5.9 (H) 0 - 5.6 %        Check with your insurance on the coverage of Shingrix (new shingles vaccine) or check at the pharmacy.  The Shingrix vaccination is a 2-shot series.  The second dose is given 6 months after the first.  (It cannot be given sooner than 2 months after the first dose - at the earliest.)  You should be aware that patients are reporting feeling a bit under the weather after the injection, including fatigue, malaise, and low-grade fever that may last a couple days.  Rarely patients can get a mild, shingles-like rash.   But these symptoms are much better than the Shingles disease.             Follow-ups after your visit        Follow-up notes from your care team     Return in about 6 months (around 5/26/2019) for Follow-up chronic condition(s).      Who to contact     If you have questions or need follow up information about today's clinic visit or your schedule please contact [facility] directly at [phone].  Normal or non-critical lab  and imaging results will be communicated to you by MyChart, letter or phone within 4 business days after the clinic has received the results. If you do not hear from us within 7 days, please contact the clinic through MyChart or phone. If you have a critical or abnormal lab result, we will notify you by phone as soon as possible.  Submit refill requests through [employer] or call your pharmacy and they will forward the refill request to us. Please allow 3 business days for your refill to be completed.          Additional Information About Your Visit        Care EveryWhere ID     This is your Care EveryWhere ID. This could be used by other organizations to access your [place] medical records  [id number]        Your Vitals Were     Pulse Temperature Respirations Pulse Oximetry BMI (Body Mass Index)       59 98.4  F (36.9  C) (Oral) 16 97% 30.78 kg/m2        Blood Pressure from Last 3 Encounters:   11/26/18 123/68   07/19/18 130/76   06/05/18 128/68    Weight from Last 3 Encounters:   11/26/18 239 lb 12 oz (108.7 kg)   07/19/18 253 lb (114.8 kg)   05/10/18 253 lb (114.8 kg)              We Performed the Following     Comprehensive metabolic panel     Hemoglobin A1c          Today's Medication Changes          These changes are accurate as of 11/26/18 10:48 AM.  If you have any questions, ask your nurse or doctor.               These medicines have changed or have updated prescriptions.        Dose/Directions    atorvastatin 80 MG tablet   Commonly known as:  LIPITOR   This may have changed:  See the new instructions.   Used for:  Hyperlipidemia with target LDL less than 70   Changed by:  Beatriz Betancur MD        Dose:  80 mg   Take 1 tablet (80 mg) by mouth daily   Quantity:  90 tablet   Refills:  3       blood glucose monitoring meter device kit   This may have changed:  Another medication with the same name was removed. Continue taking this medication, and follow the directions you see here.   Used for:  Type 2  diabetes mellitus with diabetic polyneuropathy, without long-term current use of insulin (H)   Changed by:  Beatriz Betancur MD        Use to test blood sugar 4 times daily.   Quantity:  1 kit   Refills:  0       blood glucose monitoring test strip   Commonly known as:  ONETOUCH VERIO IQ   This may have changed:  Another medication with the same name was removed. Continue taking this medication, and follow the directions you see here.   Used for:  Type 2 diabetes mellitus with diabetic polyneuropathy, without long-term current use of insulin (H)   Changed by:  Beatriz Betancur MD        Use to test blood sugar 4 times daily or as directed.   Quantity:  100 strip   Refills:  6       lisinopril 20 MG tablet   Commonly known as:  PRINIVIL/ZESTRIL   This may have changed:  See the new instructions.   Used for:  Hypertension goal BP (blood pressure) < 140/90   Changed by:  Beatriz Betancur MD        Dose:  20 mg   Take 1 tablet (20 mg) by mouth daily   Quantity:  90 tablet   Refills:  3         Stop taking these medicines if you haven't already. Please contact your care team if you have questions.     metFORMIN 500 MG tablet   Commonly known as:  GLUCOPHAGE   Stopped by:  Beatriz Betancur MD                Where to get your medicines      These medications were sent to [facility][id number] - [facility] [address]  [address] [place] [address]     Phone:  [phone]     atorvastatin 80 MG tablet    blood glucose monitoring test strip    lisinopril 20 MG tablet                Primary Care Provider Office Phone # Fax #    Beatriz Betancur [phone] [address] [facility] [address]        Equal Access to Services     [facility]: Hadjuice Trent, waaxda lusonia, qaybta kaalsoledad higuera. So [facility] [phone].    ATENCIÓN: Si habla español, tiene a delgadillo disposición servicios gratuitos de asistencia  lingüísticaRachel Ahn al [phone].    We comply with applicable federal civil rights laws and [place] laws. We do not discriminate on the basis of race, color, national origin, age, disability, sex, sexual orientation, or gender identity.            Thank you!     Thank you for choosing [facility]  for your care. Our goal is always to provide you with excellent care. Hearing back from our patients is one way we can continue to improve our services. Please take a few minutes to complete the written survey that you may receive in the mail after your visit with us. Thank you!             Your Updated Medication List - Protect others around you: Learn how to safely use, store and throw away your medicines at www.disposemymeds.org.          This list is accurate as of 11/26/18 10:48 AM.  Always use your most recent med list.                   Brand Name Dispense Instructions for use Diagnosis    aspirin 81 MG EC tablet     90 tablet    Take 1 tablet (81 mg) by mouth daily    CAD (coronary artery disease)       atorvastatin 80 MG tablet    LIPITOR    90 tablet    Take 1 tablet (80 mg) by mouth daily    Hyperlipidemia with target LDL less than 70       blood glucose lancing device     1 each    Use to test blood sugars four times daily or as directed.    Type 2 diabetes mellitus with diabetic polyneuropathy (H)       blood glucose monitoring lancets     100 each    Use to test blood sugars four times daily or as directed.    Type 2 diabetes mellitus with diabetic polyneuropathy (H)       blood glucose monitoring meter device kit     1 kit    Use to test blood sugar 4 times daily.    Type 2 diabetes mellitus with diabetic polyneuropathy, without long-term current use of insulin (H)       blood glucose monitoring test strip    ONETOUCH VERIO IQ    100 strip    Use to test blood sugar 4 times daily or as directed.    Type 2 diabetes mellitus with diabetic polyneuropathy, without long-term current use of  insulin (H)       Cyanocobalamin 1000 MCG Caps     100 capsule    Take 1 capsule by mouth daily    B12 deficiency       isosorbide mononitrate 30 MG 24 hr tablet    IMDUR    90 tablet    Take 1 tablet (30 mg) by mouth every morning    ASCVD (arteriosclerotic cardiovascular disease)       latanoprost 0.005 % ophthalmic solution    XALATAN     Place 1 drop into both eyes every evening        lisinopril 20 MG tablet    PRINIVIL/ZESTRIL    90 tablet    Take 1 tablet (20 mg) by mouth daily    Hypertension goal BP (blood pressure) < 140/90       metoprolol tartrate 50 MG tablet    LOPRESSOR    90 tablet    take one-half tablet by mouth twice daily    ASCVD (arteriosclerotic cardiovascular disease), Ischemic cardiomyopathy, Hypertension goal BP (blood pressure) < 140/90       multivitamin, therapeutic with minerals Tabs tablet      Take 1 tablet by mouth daily        nitroGLYcerin 0.4 MG sublingual tablet    NITROSTAT    25 tablet    Place 1 tablet (0.4 mg) under the tongue every 5 minutes as needed for chest pain Up to 3 doses max.    ASCVD (arteriosclerotic cardiovascular disease), Ischemic cardiomyopathy       ranitidine 150 MG tablet    ZANTAC    180 tablet    TAKE ONE TABLET BY MOUTH TWO TIMES DAILY    Gastroesophageal reflux disease without esophagitis          Statement Selected

## 2023-08-24 ENCOUNTER — APPOINTMENT (OUTPATIENT)
Dept: OTOLARYNGOLOGY | Facility: CLINIC | Age: 53
End: 2023-08-24
Payer: MEDICARE

## 2023-08-24 PROBLEM — E11.9 TYPE 2 DIABETES MELLITUS WITHOUT COMPLICATIONS: Chronic | Status: ACTIVE | Noted: 2023-08-10

## 2023-08-24 PROCEDURE — 31237 NSL/SINS NDSC SURG BX POLYPC: CPT | Mod: 50,58

## 2023-08-24 PROCEDURE — 99024 POSTOP FOLLOW-UP VISIT: CPT

## 2023-08-24 RX ORDER — LIDOCAINE HYDROCHLORIDE 20 MG/ML
2 SOLUTION ORAL; TOPICAL
Qty: 1 | Refills: 0 | Status: ACTIVE | COMMUNITY
Start: 2023-08-24 | End: 1900-01-01

## 2023-08-24 NOTE — PHYSICAL EXAM
[Nasal Endoscopy Performed] : nasal endoscopy was performed, see procedure section for findings [Midline] : trachea located in midline position [de-identified] : elongated uvula, excoriated [Normal] : no rashes

## 2023-08-24 NOTE — REASON FOR VISIT
[Subsequent Evaluation] : a subsequent evaluation for [Parent] : parent [FreeTextEntry2] : CRS s/p ESS, septo 8/18/23

## 2023-08-24 NOTE — HISTORY OF PRESENT ILLNESS
[de-identified] : 52 year old female hx CRS s/p s/p ESS, septo 8/18/23 presents for post op  Path: pending  Reports appropriate post op congestion and facial pain Using saline rinses at least 2 times a day  Denies vision changes No signs of CSF leak No recent fevers Severe sore throat- Noted elongated uvula after surgery- completed 3 day course of prednisone prescribed by on-call physician

## 2023-08-25 LAB — SURGICAL PATHOLOGY STUDY: SIGNIFICANT CHANGE UP

## 2023-09-13 ENCOUNTER — APPOINTMENT (OUTPATIENT)
Dept: OTOLARYNGOLOGY | Facility: CLINIC | Age: 53
End: 2023-09-13
Payer: MEDICARE

## 2023-09-13 VITALS
OXYGEN SATURATION: 99 % | HEIGHT: 65 IN | DIASTOLIC BLOOD PRESSURE: 84 MMHG | BODY MASS INDEX: 31.65 KG/M2 | SYSTOLIC BLOOD PRESSURE: 117 MMHG | HEART RATE: 104 BPM | WEIGHT: 190 LBS

## 2023-09-13 PROCEDURE — 99024 POSTOP FOLLOW-UP VISIT: CPT

## 2023-09-13 PROCEDURE — 31237 NSL/SINS NDSC SURG BX POLYPC: CPT | Mod: LT,78

## 2023-10-26 ENCOUNTER — APPOINTMENT (OUTPATIENT)
Dept: OTOLARYNGOLOGY | Facility: CLINIC | Age: 53
End: 2023-10-26
Payer: MEDICARE

## 2023-10-26 VITALS
BODY MASS INDEX: 31.65 KG/M2 | HEIGHT: 65 IN | DIASTOLIC BLOOD PRESSURE: 84 MMHG | SYSTOLIC BLOOD PRESSURE: 126 MMHG | HEART RATE: 88 BPM | OXYGEN SATURATION: 99 % | WEIGHT: 190 LBS

## 2023-10-26 DIAGNOSIS — J34.2 DEVIATED NASAL SEPTUM: ICD-10-CM

## 2023-10-26 DIAGNOSIS — J32.9 CHRONIC SINUSITIS, UNSPECIFIED: ICD-10-CM

## 2023-10-26 DIAGNOSIS — H90.72 MIXED CONDUCTIVE AND SENSORINEURAL HEARING LOSS, UNILATERAL, LEFT EAR, WITH UNRESTRICTED HEARING ON THE CONTRALATERAL SIDE: ICD-10-CM

## 2023-10-26 PROCEDURE — 99214 OFFICE O/P EST MOD 30 MIN: CPT | Mod: 25

## 2023-10-26 PROCEDURE — 31231 NASAL ENDOSCOPY DX: CPT | Mod: 58

## 2023-10-26 RX ORDER — DOXYCYCLINE 100 MG/1
100 CAPSULE ORAL
Qty: 20 | Refills: 0 | Status: ACTIVE | COMMUNITY
Start: 2023-10-26 | End: 1900-01-01

## 2023-11-08 NOTE — H&P PST ADULT - FUNCTIONAL STATUS
Questions on the Potassium Chloride dosage.
Switched to 10meq tablet per pharmacy recommendation
able to climb 2 flights of stairs/4-10 METS

## 2023-11-13 NOTE — ED ADULT NURSE NOTE - ISOLATION TYPE:
Received request via: Pharmacy  8/25/23lov  Was the patient seen in the last year in this department? Yes    Does the patient have an active prescription (recently filled or refills available) for medication(s) requested? No    Does the patient have [facility]PC Plus and need 100 day supply (blood pressure, diabetes and cholesterol meds only)? Patient does not have SCP  
None

## 2023-11-27 ENCOUNTER — OFFICE (OUTPATIENT)
Dept: URBAN - METROPOLITAN AREA CLINIC 12 | Facility: CLINIC | Age: 53
Setting detail: OPHTHALMOLOGY
End: 2023-11-27
Payer: MEDICARE

## 2023-11-27 ENCOUNTER — RX ONLY (RX ONLY)
Age: 53
End: 2023-11-27

## 2023-11-27 DIAGNOSIS — H52.4: ICD-10-CM

## 2023-11-27 DIAGNOSIS — E11.9: ICD-10-CM

## 2023-11-27 DIAGNOSIS — H10.45: ICD-10-CM

## 2023-11-27 DIAGNOSIS — H16.223: ICD-10-CM

## 2023-11-27 DIAGNOSIS — H43.393: ICD-10-CM

## 2023-11-27 DIAGNOSIS — H25.12: ICD-10-CM

## 2023-11-27 PROCEDURE — 92134 CPTRZ OPH DX IMG PST SGM RTA: CPT | Performed by: STUDENT IN AN ORGANIZED HEALTH CARE EDUCATION/TRAINING PROGRAM

## 2023-11-27 PROCEDURE — 92004 COMPRE OPH EXAM NEW PT 1/>: CPT | Performed by: STUDENT IN AN ORGANIZED HEALTH CARE EDUCATION/TRAINING PROGRAM

## 2023-11-27 PROCEDURE — 92015 DETERMINE REFRACTIVE STATE: CPT | Performed by: STUDENT IN AN ORGANIZED HEALTH CARE EDUCATION/TRAINING PROGRAM

## 2023-11-27 ASSESSMENT — SPHEQUIV_DERIVED
OD_SPHEQUIV: -1
OD_SPHEQUIV: -0.125
OS_SPHEQUIV: -1.125
OS_SPHEQUIV: -0.25
OS_SPHEQUIV: -1.125
OD_SPHEQUIV: -1

## 2023-11-27 ASSESSMENT — REFRACTION_MANIFEST
OS_VA1: 20/20
OD_CYLINDER: -0.75
OS_AXIS: 175
OD_ADD: +1.75
OS_SPHERE: +0.25
OD_AXIS: 15
OD_SPHERE: -0.75
OD_AXIS: 10
OS_AXIS: 180
OD_VA1: 20/20
OD_VA1: 20/20
OS_VA1: 20/20
OS_CYLINDER: -1.00
OS_SPHERE: -0.75
OS_ADD: +1.75
OD_CYLINDER: -0.50
OD_SPHERE: +0.25
OS_CYLINDER: -0.75

## 2023-11-27 ASSESSMENT — REFRACTION_AUTOREFRACTION
OD_CYLINDER: -0.50
OD_AXIS: 016
OS_AXIS: 180
OS_CYLINDER: -0.75
OD_SPHERE: -0.75
OS_SPHERE: -0.75

## 2023-11-27 ASSESSMENT — TEAR BREAK UP TIME (TBUT)
OD_TBUT: 2 SECS
OS_TBUT: 2 SECS

## 2023-11-27 ASSESSMENT — CONFRONTATIONAL VISUAL FIELD TEST (CVF)
OD_FINDINGS: FULL
OS_FINDINGS: FULL

## 2023-11-27 ASSESSMENT — SUPERFICIAL PUNCTATE KERATITIS (SPK)
OS_SPK: 1+
OD_SPK: 1+

## 2024-07-30 ENCOUNTER — APPOINTMENT (OUTPATIENT)
Dept: OTOLARYNGOLOGY | Facility: CLINIC | Age: 54
End: 2024-07-30

## 2024-11-21 ENCOUNTER — APPOINTMENT (OUTPATIENT)
Dept: PULMONOLOGY | Facility: CLINIC | Age: 54
End: 2024-11-21
Payer: MEDICARE

## 2024-11-21 VITALS
TEMPERATURE: 98 F | BODY MASS INDEX: 31.65 KG/M2 | OXYGEN SATURATION: 99 % | DIASTOLIC BLOOD PRESSURE: 76 MMHG | HEART RATE: 107 BPM | HEIGHT: 65 IN | SYSTOLIC BLOOD PRESSURE: 128 MMHG | WEIGHT: 190 LBS

## 2024-11-21 DIAGNOSIS — J30.81 ALLERGIC RHINITIS DUE TO ANIMAL (CAT) (DOG) HAIR AND DANDER: ICD-10-CM

## 2024-11-21 DIAGNOSIS — J45.909 UNSPECIFIED ASTHMA, UNCOMPLICATED: ICD-10-CM

## 2024-11-21 PROCEDURE — 94010 BREATHING CAPACITY TEST: CPT

## 2024-11-21 PROCEDURE — 94727 GAS DIL/WSHOT DETER LNG VOL: CPT

## 2024-11-21 PROCEDURE — 94729 DIFFUSING CAPACITY: CPT

## 2024-11-21 PROCEDURE — ZZZZZ: CPT

## 2024-11-21 PROCEDURE — 99204 OFFICE O/P NEW MOD 45 MIN: CPT | Mod: 25

## 2024-11-21 RX ORDER — SPIRONOLACTONE 25 MG/1
25 TABLET, FILM COATED ORAL
Refills: 0 | Status: ACTIVE | COMMUNITY

## 2024-11-21 RX ORDER — TIRZEPATIDE 2.5 MG/.5ML
2.5 INJECTION, SOLUTION SUBCUTANEOUS
Refills: 0 | Status: ACTIVE | COMMUNITY

## 2024-11-21 RX ORDER — ALBUTEROL SULFATE 90 UG/1
108 (90 BASE) INHALANT RESPIRATORY (INHALATION) EVERY 4 HOURS
Qty: 1 | Refills: 6 | Status: ACTIVE | COMMUNITY
Start: 2024-11-21 | End: 1900-01-01

## 2024-12-04 ENCOUNTER — APPOINTMENT (OUTPATIENT)
Dept: OTOLARYNGOLOGY | Facility: CLINIC | Age: 54
End: 2024-12-04
Payer: MEDICARE

## 2024-12-04 VITALS
WEIGHT: 180 LBS | BODY MASS INDEX: 29.99 KG/M2 | HEART RATE: 92 BPM | TEMPERATURE: 98.2 F | HEIGHT: 65 IN | DIASTOLIC BLOOD PRESSURE: 89 MMHG | SYSTOLIC BLOOD PRESSURE: 125 MMHG | OXYGEN SATURATION: 100 %

## 2024-12-04 DIAGNOSIS — J32.9 CHRONIC SINUSITIS, UNSPECIFIED: ICD-10-CM

## 2024-12-04 DIAGNOSIS — J34.2 DEVIATED NASAL SEPTUM: ICD-10-CM

## 2024-12-04 PROCEDURE — 99214 OFFICE O/P EST MOD 30 MIN: CPT | Mod: 25

## 2024-12-04 PROCEDURE — 31231 NASAL ENDOSCOPY DX: CPT

## 2024-12-04 RX ORDER — DOXYCYCLINE 100 MG/1
100 CAPSULE ORAL
Qty: 20 | Refills: 0 | Status: ACTIVE | COMMUNITY
Start: 2024-12-04 | End: 1900-01-01

## 2024-12-24 ENCOUNTER — APPOINTMENT (OUTPATIENT)
Dept: PULMONOLOGY | Facility: CLINIC | Age: 54
End: 2024-12-24
Payer: MEDICARE

## 2024-12-24 VITALS
HEART RATE: 82 BPM | BODY MASS INDEX: 30.82 KG/M2 | TEMPERATURE: 98 F | SYSTOLIC BLOOD PRESSURE: 122 MMHG | DIASTOLIC BLOOD PRESSURE: 72 MMHG | WEIGHT: 185 LBS | HEIGHT: 65 IN | OXYGEN SATURATION: 99 %

## 2024-12-24 DIAGNOSIS — J30.81 ALLERGIC RHINITIS DUE TO ANIMAL (CAT) (DOG) HAIR AND DANDER: ICD-10-CM

## 2024-12-24 DIAGNOSIS — J45.909 UNSPECIFIED ASTHMA, UNCOMPLICATED: ICD-10-CM

## 2024-12-24 PROCEDURE — 99214 OFFICE O/P EST MOD 30 MIN: CPT

## 2024-12-24 RX ORDER — BUDESONIDE AND FORMOTEROL FUMARATE DIHYDRATE 160; 4.5 UG/1; UG/1
AEROSOL RESPIRATORY (INHALATION)
Refills: 0 | Status: ACTIVE | COMMUNITY

## 2025-01-16 ENCOUNTER — APPOINTMENT (OUTPATIENT)
Dept: PULMONOLOGY | Facility: CLINIC | Age: 55
End: 2025-01-16

## 2025-01-21 ENCOUNTER — APPOINTMENT (OUTPATIENT)
Dept: OTOLARYNGOLOGY | Facility: CLINIC | Age: 55
End: 2025-01-21

## 2025-04-02 ENCOUNTER — APPOINTMENT (OUTPATIENT)
Dept: OTOLARYNGOLOGY | Facility: CLINIC | Age: 55
End: 2025-04-02
Payer: MEDICARE

## 2025-04-02 VITALS — DIASTOLIC BLOOD PRESSURE: 86 MMHG | SYSTOLIC BLOOD PRESSURE: 134 MMHG

## 2025-04-02 DIAGNOSIS — J32.9 CHRONIC SINUSITIS, UNSPECIFIED: ICD-10-CM

## 2025-04-02 DIAGNOSIS — J34.2 DEVIATED NASAL SEPTUM: ICD-10-CM

## 2025-04-02 DIAGNOSIS — H93.12 TINNITUS, LEFT EAR: ICD-10-CM

## 2025-04-02 PROCEDURE — 92557 COMPREHENSIVE HEARING TEST: CPT

## 2025-04-02 PROCEDURE — 99214 OFFICE O/P EST MOD 30 MIN: CPT | Mod: 25

## 2025-04-02 PROCEDURE — 92567 TYMPANOMETRY: CPT

## 2025-04-02 PROCEDURE — 31231 NASAL ENDOSCOPY DX: CPT

## 2025-04-02 RX ORDER — IPRATROPIUM BROMIDE 42 UG/1
0.06 SPRAY, METERED NASAL
Qty: 2 | Refills: 3 | Status: ACTIVE | COMMUNITY
Start: 2025-04-02 | End: 1900-01-01

## 2025-04-07 ENCOUNTER — OUTPATIENT (OUTPATIENT)
Dept: OUTPATIENT SERVICES | Facility: HOSPITAL | Age: 55
LOS: 1 days | End: 2025-04-07
Payer: MEDICAID

## 2025-04-07 ENCOUNTER — APPOINTMENT (OUTPATIENT)
Dept: CT IMAGING | Facility: CLINIC | Age: 55
End: 2025-04-07
Payer: MEDICARE

## 2025-04-07 DIAGNOSIS — J32.9 CHRONIC SINUSITIS, UNSPECIFIED: ICD-10-CM

## 2025-04-07 DIAGNOSIS — Z98.890 OTHER SPECIFIED POSTPROCEDURAL STATES: Chronic | ICD-10-CM

## 2025-04-07 PROCEDURE — 70486 CT MAXILLOFACIAL W/O DYE: CPT

## 2025-04-07 PROCEDURE — 70486 CT MAXILLOFACIAL W/O DYE: CPT | Mod: 26

## 2025-04-14 ENCOUNTER — NON-APPOINTMENT (OUTPATIENT)
Age: 55
End: 2025-04-14

## 2025-06-24 ENCOUNTER — APPOINTMENT (OUTPATIENT)
Dept: OTOLARYNGOLOGY | Facility: CLINIC | Age: 55
End: 2025-06-24
Payer: MEDICARE

## 2025-06-24 VITALS — BODY MASS INDEX: 30.82 KG/M2 | HEIGHT: 65 IN | WEIGHT: 185 LBS

## 2025-06-24 PROCEDURE — 99214 OFFICE O/P EST MOD 30 MIN: CPT | Mod: 25

## 2025-06-24 PROCEDURE — 31231 NASAL ENDOSCOPY DX: CPT

## 2025-07-14 ENCOUNTER — APPOINTMENT (OUTPATIENT)
Dept: ORTHOPEDIC SURGERY | Facility: CLINIC | Age: 55
End: 2025-07-14
Payer: OTHER MISCELLANEOUS

## 2025-07-14 VITALS — HEIGHT: 65 IN | WEIGHT: 185 LBS | BODY MASS INDEX: 30.82 KG/M2

## 2025-07-14 PROCEDURE — 99203 OFFICE O/P NEW LOW 30 MIN: CPT

## 2025-07-22 ENCOUNTER — APPOINTMENT (OUTPATIENT)
Dept: OTOLARYNGOLOGY | Facility: CLINIC | Age: 55
End: 2025-07-22
Payer: MEDICARE

## 2025-07-22 VITALS
BODY MASS INDEX: 30.82 KG/M2 | HEIGHT: 65 IN | HEART RATE: 106 BPM | SYSTOLIC BLOOD PRESSURE: 155 MMHG | WEIGHT: 185 LBS | DIASTOLIC BLOOD PRESSURE: 99 MMHG | OXYGEN SATURATION: 99 %

## 2025-07-22 DIAGNOSIS — R09.A2 FOREIGN BODY SENSATION, THROAT: ICD-10-CM

## 2025-07-22 DIAGNOSIS — R49.0 DYSPHONIA: ICD-10-CM

## 2025-07-22 PROCEDURE — 31579 LARYNGOSCOPY TELESCOPIC: CPT

## 2025-07-22 PROCEDURE — 99214 OFFICE O/P EST MOD 30 MIN: CPT | Mod: 25

## 2025-07-22 RX ORDER — OMEPRAZOLE 40 MG/1
40 CAPSULE, DELAYED RELEASE ORAL DAILY
Qty: 30 | Refills: 3 | Status: ACTIVE | COMMUNITY
Start: 2025-07-22 | End: 1900-01-01

## 2025-07-22 RX ORDER — FAMOTIDINE 40 MG/1
40 TABLET, FILM COATED ORAL
Qty: 30 | Refills: 4 | Status: ACTIVE | COMMUNITY
Start: 2025-07-22 | End: 1900-01-01

## 2025-08-26 ENCOUNTER — APPOINTMENT (OUTPATIENT)
Dept: OTOLARYNGOLOGY | Facility: CLINIC | Age: 55
End: 2025-08-26

## (undated) DEVICE — BLADE MEDTRONIC ENT RAD 40 DEGREE ROTATABLE 4MM X 11CM

## (undated) DEVICE — POSITIONER FOAM EGG CRATE ULNAR 2PCS (PINK)

## (undated) DEVICE — SOL ANTI FOG

## (undated) DEVICE — LABELS BLANK W PEN

## (undated) DEVICE — TUBING IRRIGATION STRAIGHT SHOT

## (undated) DEVICE — CLEANING SHEATH ENDO-SCRUB FOR STORZ 7210AA TELESCOPE 4MM 0 DEGREE

## (undated) DEVICE — SUT ETHILON 3-0 30" FS-1

## (undated) DEVICE — Device

## (undated) DEVICE — ACCLARENT SET INFLATION DEVICE

## (undated) DEVICE — TUBING SUCTION NONCONDUCTIVE 6MM X 12FT

## (undated) DEVICE — CATH IV SAFE INSYTE 14G X 1.75" (ORANGE)

## (undated) DEVICE — DRSG TELFA 3 X 8

## (undated) DEVICE — NDL HYPO REGULAR BEVEL 25G X 1.5" (BLUE)

## (undated) DEVICE — MEDTRONIC AXIEM PATIENT TRACKER NON-INVASIVE

## (undated) DEVICE — WARMING BLANKET FULL UNDERBODY

## (undated) DEVICE — POSITIONER STRAP ARMBOARD VELCRO TS-30

## (undated) DEVICE — MEDTRONIC INSTRUMENT TRACKER ENT

## (undated) DEVICE — PACK SMR

## (undated) DEVICE — PAD MEDTRONIC ENT ADHESIVE PAD

## (undated) DEVICE — SUT CHROMIC 4-0 18" G-2

## (undated) DEVICE — VENODYNE/SCD SLEEVE CALF MEDIUM

## (undated) DEVICE — DRSG SPLINT INTRA NASAL .5MM STANDARD THICK

## (undated) DEVICE — GLV 7.5 PROTEXIS (CREAM) MICRO

## (undated) DEVICE — SUT PLAIN GUT 4-0 18" SC-1

## (undated) DEVICE — CANISTER DISPOSABLE THIN WALL 3000CC

## (undated) DEVICE — SYR CONTROL LUER LOK 10CC

## (undated) DEVICE — DRSG MEROCEL 2000 WITH STRING 8CM

## (undated) DEVICE — BLADE MEDTRONIC ENT FUSION TRICUT ROTATABLE STRAIGHT 4MM X 13CM

## (undated) DEVICE — BLADE MEDTRONIC ENT TRICUT ROTATABLE STRAIGHT 4MM X 11CM

## (undated) DEVICE — SYR LUER LOK 5CC

## (undated) DEVICE — VAGINAL PACKING 0.5"

## (undated) DEVICE — STRYKER MALLEABLE SUCTION MEDIUM STANDARD